# Patient Record
Sex: FEMALE | Race: WHITE | NOT HISPANIC OR LATINO
[De-identification: names, ages, dates, MRNs, and addresses within clinical notes are randomized per-mention and may not be internally consistent; named-entity substitution may affect disease eponyms.]

---

## 2017-02-16 ENCOUNTER — RESULT REVIEW (OUTPATIENT)
Age: 72
End: 2017-02-16

## 2018-03-16 ENCOUNTER — RESULT REVIEW (OUTPATIENT)
Age: 73
End: 2018-03-16

## 2019-12-02 ENCOUNTER — APPOINTMENT (OUTPATIENT)
Dept: ORTHOPEDIC SURGERY | Facility: CLINIC | Age: 74
End: 2019-12-02
Payer: MEDICARE

## 2019-12-02 VITALS
WEIGHT: 145 LBS | HEIGHT: 59 IN | DIASTOLIC BLOOD PRESSURE: 80 MMHG | BODY MASS INDEX: 29.23 KG/M2 | SYSTOLIC BLOOD PRESSURE: 169 MMHG

## 2019-12-02 DIAGNOSIS — M19.011 PRIMARY OSTEOARTHRITIS, RIGHT SHOULDER: ICD-10-CM

## 2019-12-02 DIAGNOSIS — M67.911 UNSPECIFIED DISORDER OF SYNOVIUM AND TENDON, RIGHT SHOULDER: ICD-10-CM

## 2019-12-02 PROCEDURE — 73030 X-RAY EXAM OF SHOULDER: CPT | Mod: RT

## 2019-12-02 PROCEDURE — 99203 OFFICE O/P NEW LOW 30 MIN: CPT

## 2021-02-04 ENCOUNTER — TRANSCRIPTION ENCOUNTER (OUTPATIENT)
Age: 76
End: 2021-02-04

## 2022-06-20 ENCOUNTER — APPOINTMENT (OUTPATIENT)
Dept: PULMONOLOGY | Facility: CLINIC | Age: 77
End: 2022-06-20
Payer: MEDICARE

## 2022-06-20 ENCOUNTER — NON-APPOINTMENT (OUTPATIENT)
Age: 77
End: 2022-06-20

## 2022-06-20 VITALS
BODY MASS INDEX: 29.03 KG/M2 | HEIGHT: 59 IN | OXYGEN SATURATION: 98 % | SYSTOLIC BLOOD PRESSURE: 147 MMHG | DIASTOLIC BLOOD PRESSURE: 73 MMHG | WEIGHT: 144 LBS | HEART RATE: 72 BPM

## 2022-06-20 PROCEDURE — ZZZZZ: CPT

## 2022-06-20 PROCEDURE — 94729 DIFFUSING CAPACITY: CPT

## 2022-06-20 PROCEDURE — 99204 OFFICE O/P NEW MOD 45 MIN: CPT | Mod: 25

## 2022-06-20 PROCEDURE — 94727 GAS DIL/WSHOT DETER LNG VOL: CPT

## 2022-06-20 PROCEDURE — 94010 BREATHING CAPACITY TEST: CPT

## 2022-06-20 PROCEDURE — 94618 PULMONARY STRESS TESTING: CPT

## 2022-06-20 NOTE — HISTORY OF PRESENT ILLNESS
[Never] : never [TextBox_4] : 77F HLD, GERD\par \par reports MOODY for last few years\par not at rest, worst in bed when moving around\par doesn't walk much but gets winded when she does\par uphill is difficult\par interestingly, she folk dances and does not find herself sob when doing so\par denies coughing/wheezing/chest pain on exertion\par never hospital for respiratory issues\par has been told her heart function is "ok", some minor issues but nothing that needs to be addressed.

## 2022-06-20 NOTE — REVIEW OF SYSTEMS
[Cough] : no cough [Sputum] : no sputum [SOB on Exertion] : sob on exertion [Negative] : Constitutional

## 2022-06-20 NOTE — ASSESSMENT
[FreeTextEntry1] : suggest ct chest to further evaluate\par perhaps scoliosis is limiting her capacity when exerting herself although she doesn’t have an issue when dancing which is strange.\par may need supplemental O2 but will check ct chest first and repeat exercise oximetry at follow up to confirm.

## 2022-06-20 NOTE — PROCEDURE
[FreeTextEntry1] : cxr 4/21/22 at [facility] shows clear lungs and scoliosis.\par \par PFT: Normal spirometry.  Lung volumes normal. DLCO normal.\par \par exercise oximetry: desat to 87% after 3 min of walking on room air, improves with 3L\par

## 2022-06-20 NOTE — CONSULT LETTER
[FreeTextEntry1] : Dear ,\par \par I had the pleasure of evaluating your patient, SERGO BURROWS today in pulmonary consultation.  Please refer to my attached note for my findings and recommendations.\par \par \par Thank you for allowing me to participate in the care of your patient, please feel free to call with any questions or concerns.\par \par \par Sincerely,\par \par Hellen Lai MD\par [facility] \par [facility]\par \par

## 2022-07-01 ENCOUNTER — OUTPATIENT (OUTPATIENT)
Dept: OUTPATIENT SERVICES | Facility: HOSPITAL | Age: 77
LOS: 1 days | End: 2022-07-01
Payer: MEDICARE

## 2022-07-01 ENCOUNTER — APPOINTMENT (OUTPATIENT)
Dept: CT IMAGING | Facility: CLINIC | Age: 77
End: 2022-07-01

## 2022-07-01 DIAGNOSIS — R06.00 DYSPNEA, UNSPECIFIED: ICD-10-CM

## 2022-07-01 PROCEDURE — 71250 CT THORAX DX C-: CPT | Mod: 26,MH

## 2022-07-01 PROCEDURE — 71250 CT THORAX DX C-: CPT

## 2022-07-15 ENCOUNTER — APPOINTMENT (OUTPATIENT)
Dept: PULMONOLOGY | Facility: CLINIC | Age: 77
End: 2022-07-15

## 2022-07-15 VITALS — DIASTOLIC BLOOD PRESSURE: 70 MMHG | SYSTOLIC BLOOD PRESSURE: 138 MMHG

## 2022-07-15 VITALS
HEART RATE: 85 BPM | WEIGHT: 142 LBS | OXYGEN SATURATION: 97 % | SYSTOLIC BLOOD PRESSURE: 170 MMHG | DIASTOLIC BLOOD PRESSURE: 78 MMHG | BODY MASS INDEX: 28.68 KG/M2

## 2022-07-15 PROCEDURE — 94618 PULMONARY STRESS TESTING: CPT

## 2022-07-15 PROCEDURE — 99214 OFFICE O/P EST MOD 30 MIN: CPT | Mod: 25

## 2022-07-15 NOTE — HISTORY OF PRESENT ILLNESS
[Never] : never [TextBox_4] : here to repeat exercise oximetry and review chest ct\par \par Prior hx:\par \par 77F HLD, GERD\par \par reports MOODY for last few years\par not at rest, worst in bed when moving around\par doesn't walk much but gets winded when she does\par uphill is difficult\par interestingly, she folk dances and does not find herself sob when doing so\par denies coughing/wheezing/chest pain on exertion\par never hospital for respiratory issues\par has been told her heart function is "ok", some minor issues but nothing that needs to be addressed.

## 2022-07-15 NOTE — PROCEDURE
[FreeTextEntry1] : exercise oximetry: no O2 desat with 6 min of walking on room air\par \par EXAM: [id number] - CT CHEST - ORDERED BY: GERARDO BONILLA\par \par \par PROCEDURE DATE: 07/01/2022\par \par \par \par INTERPRETATION: HISTORY: Ordering Dxs: R06.00 Dyspnea, unspecified /// Admitting Dxs: R06.00 DYSPNEA, UNSPECIFIED. Dyspnea exertion with hypoxia.\par \par EXAMINATION: CT CHEST was performed without IV contrast.\par \par COMPARISON: 4/20/2016 cardiac CT.\par \par FINDINGS:\par \par AIRWAYS, LUNGS, PLEURA: Clear central airways. Mild scarring at the lung apices. Left basilar 0.7 x 0.6 cm solid nodule (image 108, series 2). No focal lung consolidation. No pleural effusion.\par \par MEDIASTINUM: Heart size normal. Coronary atherosclerosis. No pericardial effusion. Thoracic aorta normal caliber. No large mediastinal lymph nodes.\par \par IMAGED ABDOMEN: Unremarkable.\par \par SOFT TISSUES: Unremarkable.\par \par BONES: No acute osseous abnormality.\par \par \par IMPRESSION:.\par \par Left lower lobe 0.7 cm nodule; indeterminate and recommend CT chest follow-up in 6 months to assess stability.\par \par No focal lung consolidation.\par \par Coronary atherosclerosis.\par \par --- End of Report ---\par \par  JOVI SHIPMAN MD; Attending Radiologist\par This document has been electronically signed. Jul 8 2022 6:20AM\par \par Reviewed:\par \par cxr 4/21/22 at [facility] shows clear lungs and scoliosis.\par \par PFT: Normal spirometry.  Lung volumes normal. DLCO normal.\par \par exercise oximetry: desat to 87% after 3 min of walking on room air, improves with 3L\par

## 2022-07-15 NOTE — CONSULT LETTER
[FreeTextEntry1] : Dear ,\par \par I had the pleasure of evaluating your patient, SERGO BURROWS today in pulmonary consultation.  Please refer to my attached note for my findings and recommendations.\par \par \par Thank you for allowing me to participate in the care of your patient, please feel free to call with any questions or concerns.\par \par \par Sincerely,\par \par Hellen Lai MD\par [facility] \par [facility]\par \par

## 2022-07-15 NOTE — ASSESSMENT
[FreeTextEntry1] : \par perhaps scoliosis is limiting her capacity when exerting herself although she doesn’t have an issue when dancing which is strange.\par \par cont to exercise\par \par \par Nodule needs fu 6 mo

## 2022-07-15 NOTE — REVIEW OF SYSTEMS
[SOB on Exertion] : sob on exertion [Negative] : Constitutional [Cough] : no cough [Sputum] : no sputum

## 2022-11-09 ENCOUNTER — OFFICE (OUTPATIENT)
Dept: URBAN - METROPOLITAN AREA CLINIC 109 | Facility: CLINIC | Age: 77
Setting detail: OPHTHALMOLOGY
End: 2022-11-09
Payer: MEDICARE

## 2022-11-09 DIAGNOSIS — H35.031: ICD-10-CM

## 2022-11-09 DIAGNOSIS — H35.371: ICD-10-CM

## 2022-11-09 PROCEDURE — 92134 CPTRZ OPH DX IMG PST SGM RTA: CPT | Performed by: OPHTHALMOLOGY

## 2022-11-09 PROCEDURE — 92014 COMPRE OPH EXAM EST PT 1/>: CPT | Performed by: OPHTHALMOLOGY

## 2022-11-09 ASSESSMENT — REFRACTION_MANIFEST
OD_ADD: +2.75
OD_VA1: 20/25
OS_SPHERE: +1.00
OS_VA1: 20/25
OS_ADD: +2.75
OS_CYLINDER: -2.50
OD_AXIS: 85
OS_AXIS: 75
OD_SPHERE: +1.50
OD_CYLINDER: -1.50

## 2022-11-09 ASSESSMENT — TONOMETRY
OS_IOP_MMHG: 17
OD_IOP_MMHG: 17

## 2022-11-09 ASSESSMENT — SPHEQUIV_DERIVED
OD_SPHEQUIV: 0.75
OD_SPHEQUIV: 0.625
OS_SPHEQUIV: 0
OS_SPHEQUIV: -0.25

## 2022-11-09 ASSESSMENT — REFRACTION_CURRENTRX
OD_CYLINDER: -1.50
OS_OVR_VA: 20/
OS_SPHERE: +2.75
OS_AXIS: 84
OS_CYLINDER: -2.50
OD_AXIS: 78
OD_SPHERE: +1.50
OD_OVR_VA: 20/

## 2022-11-09 ASSESSMENT — REFRACTION_AUTOREFRACTION
OS_AXIS: 76
OD_CYLINDER: -1.75
OD_AXIS: 90
OS_CYLINDER: -2.50
OS_SPHERE: +1.25
OD_SPHERE: +1.50

## 2022-11-09 ASSESSMENT — CONFRONTATIONAL VISUAL FIELD TEST (CVF)
OS_FINDINGS: FULL
OD_FINDINGS: FULL

## 2022-11-09 ASSESSMENT — VISUAL ACUITY
OS_BCVA: 20/40-2
OD_BCVA: 20/40

## 2023-02-06 ENCOUNTER — APPOINTMENT (OUTPATIENT)
Dept: PULMONOLOGY | Facility: CLINIC | Age: 78
End: 2023-02-06
Payer: MEDICARE

## 2023-02-06 VITALS
SYSTOLIC BLOOD PRESSURE: 125 MMHG | OXYGEN SATURATION: 97 % | WEIGHT: 151 LBS | DIASTOLIC BLOOD PRESSURE: 57 MMHG | BODY MASS INDEX: 30.44 KG/M2 | HEIGHT: 59 IN | HEART RATE: 74 BPM

## 2023-02-06 PROCEDURE — 99213 OFFICE O/P EST LOW 20 MIN: CPT

## 2023-02-06 NOTE — PROCEDURE
[FreeTextEntry1] : Prior exams reviewed:\par \par \par exercise oximetry: no O2 desat with 6 min of walking on room air\par \par EXAM: [id number] - CT CHEST - ORDERED BY: GERARDO BONILLA\par \par \par PROCEDURE DATE: 07/01/2022\par \par \par \par INTERPRETATION: HISTORY: Ordering Dxs: R06.00 Dyspnea, unspecified /// Admitting Dxs: R06.00 DYSPNEA, UNSPECIFIED. Dyspnea exertion with hypoxia.\par \par EXAMINATION: CT CHEST was performed without IV contrast.\par \par COMPARISON: 4/20/2016 cardiac CT.\par \par FINDINGS:\par \par AIRWAYS, LUNGS, PLEURA: Clear central airways. Mild scarring at the lung apices. Left basilar 0.7 x 0.6 cm solid nodule (image 108, series 2). No focal lung consolidation. No pleural effusion.\par \par MEDIASTINUM: Heart size normal. Coronary atherosclerosis. No pericardial effusion. Thoracic aorta normal caliber. No large mediastinal lymph nodes.\par \par IMAGED ABDOMEN: Unremarkable.\par \par SOFT TISSUES: Unremarkable.\par \par BONES: No acute osseous abnormality.\par \par \par IMPRESSION:.\par \par Left lower lobe 0.7 cm nodule; indeterminate and recommend CT chest follow-up in 6 months to assess stability.\par \par No focal lung consolidation.\par \par Coronary atherosclerosis.\par \par --- End of Report ---\par \par  JOVI SHIPMAN MD; Attending Radiologist\par This document has been electronically signed. Jul 8 2022 6:20AM\par \par Reviewed:\par \par cxr 4/21/22 at [facility] shows clear lungs and scoliosis.\par \par PFT: Normal spirometry.  Lung volumes normal. DLCO normal.\par \par exercise oximetry: desat to 87% after 3 min of walking on room air, improves with 3L\par

## 2023-02-06 NOTE — HISTORY OF PRESENT ILLNESS
[TextBox_4] : due for repeat ct chest to fu 7mm nodule\par \par stable chronic [place], still dances without issue

## 2023-02-22 ENCOUNTER — APPOINTMENT (OUTPATIENT)
Dept: CT IMAGING | Facility: CLINIC | Age: 78
End: 2023-02-22
Payer: MEDICARE

## 2023-02-22 ENCOUNTER — OUTPATIENT (OUTPATIENT)
Dept: OUTPATIENT SERVICES | Facility: HOSPITAL | Age: 78
LOS: 1 days | End: 2023-02-22
Payer: MEDICARE

## 2023-02-22 DIAGNOSIS — R91.1 SOLITARY PULMONARY NODULE: ICD-10-CM

## 2023-02-22 PROCEDURE — 71250 CT THORAX DX C-: CPT | Mod: 26,MH

## 2023-02-22 PROCEDURE — 71250 CT THORAX DX C-: CPT

## 2023-07-27 ENCOUNTER — NON-APPOINTMENT (OUTPATIENT)
Age: 78
End: 2023-07-27

## 2023-08-01 ENCOUNTER — APPOINTMENT (OUTPATIENT)
Dept: ORTHOPEDIC SURGERY | Facility: CLINIC | Age: 78
End: 2023-08-01
Payer: MEDICARE

## 2023-08-01 ENCOUNTER — NON-APPOINTMENT (OUTPATIENT)
Age: 78
End: 2023-08-01

## 2023-08-01 VITALS — BODY MASS INDEX: 28.22 KG/M2 | HEIGHT: 59 IN | WEIGHT: 140 LBS

## 2023-08-01 PROCEDURE — 99203 OFFICE O/P NEW LOW 30 MIN: CPT | Mod: 25

## 2023-08-01 PROCEDURE — 29075 APPL CST ELBW FNGR SHORT ARM: CPT | Mod: LT

## 2023-08-01 PROCEDURE — 73110 X-RAY EXAM OF WRIST: CPT | Mod: LT

## 2023-08-07 ENCOUNTER — OUTPATIENT (OUTPATIENT)
Dept: OUTPATIENT SERVICES | Facility: HOSPITAL | Age: 78
LOS: 1 days | End: 2023-08-07
Payer: MEDICARE

## 2023-08-07 ENCOUNTER — APPOINTMENT (OUTPATIENT)
Dept: CT IMAGING | Facility: CLINIC | Age: 78
End: 2023-08-07
Payer: MEDICARE

## 2023-08-07 DIAGNOSIS — R91.1 SOLITARY PULMONARY NODULE: ICD-10-CM

## 2023-08-07 PROCEDURE — 71250 CT THORAX DX C-: CPT | Mod: 26,MH

## 2023-08-07 PROCEDURE — 71250 CT THORAX DX C-: CPT

## 2023-08-08 ENCOUNTER — APPOINTMENT (OUTPATIENT)
Dept: ORTHOPEDIC SURGERY | Facility: CLINIC | Age: 78
End: 2023-08-08
Payer: MEDICARE

## 2023-08-08 PROCEDURE — 99213 OFFICE O/P EST LOW 20 MIN: CPT | Mod: 24

## 2023-08-08 PROCEDURE — 73110 X-RAY EXAM OF WRIST: CPT | Mod: LT

## 2023-08-15 ENCOUNTER — APPOINTMENT (OUTPATIENT)
Dept: ORTHOPEDIC SURGERY | Facility: CLINIC | Age: 78
End: 2023-08-15

## 2023-08-17 ENCOUNTER — APPOINTMENT (OUTPATIENT)
Dept: PULMONOLOGY | Facility: CLINIC | Age: 78
End: 2023-08-17
Payer: MEDICARE

## 2023-08-17 VITALS
BODY MASS INDEX: 29.23 KG/M2 | HEIGHT: 59 IN | OXYGEN SATURATION: 98 % | SYSTOLIC BLOOD PRESSURE: 139 MMHG | WEIGHT: 145 LBS | HEART RATE: 77 BPM | DIASTOLIC BLOOD PRESSURE: 74 MMHG

## 2023-08-17 DIAGNOSIS — R91.1 SOLITARY PULMONARY NODULE: ICD-10-CM

## 2023-08-17 DIAGNOSIS — R06.00 DYSPNEA, UNSPECIFIED: ICD-10-CM

## 2023-08-17 PROCEDURE — 99214 OFFICE O/P EST MOD 30 MIN: CPT

## 2023-08-17 NOTE — ASSESSMENT
[FreeTextEntry1] : nodules stable, repeat ct 1 year  dyspnea stable, normal pulm testing, exercise encouraged  fu 1 year

## 2023-08-17 NOTE — PROCEDURE
[FreeTextEntry1] :   EXAM: [id number] - CT CHEST - ORDERED BY: GERARDO BONILLA   PROCEDURE DATE: 08/07/2023    INTERPRETATION: INDICATION: Nodule follow-up  TECHNIQUE: A volumetric CT acquisition of the chest was obtained from the thoracic inlet to the upper abdomen without the use of intravenous contrast. Coronal and sagittal reconstructed images are provided.  COMPARISON: Chest CT 2/22/2023  FINDINGS:  Lungs/Airways/Pleura: The central airways are patent. No pleural effusion. A 6 mm left lower lobe nodule on series 3, image 121 and 5 mm nodule along the right major fissure in the superior segment right lower lobe on image 61 are unchanged since July 2022. The lungs are otherwise clear. No new nodules.  Mediastinum/Lymph nodes: No thoracic adenopathy.  Heart and Vessels: The great vessels are normal in size. The heart is normal in size. No pericardial effusion. Coronary artery calcification.  Upper Abdomen: Unremarkable.  Osseous structures and Soft Tissues: Reverse left shoulder arthroplasty. Old left posterolateral third rib fracture.  IMPRESSION: Stable 6 mm left lower lobe and 5 mm right perifissural nodules since July 2022. No new nodules.  --- End of Report ---       KATIE MOHAMUD M.D., Attending Radiologist This document has been electronically signed. Aug 9 2023 4:57PM  Prior exams reviewed:   exercise oximetry: no O2 desat with 6 min of walking on room air  EXAM: [id number] - CT CHEST - ORDERED BY: GERARDO BONILLA   PROCEDURE DATE: 07/01/2022    INTERPRETATION: HISTORY: Ordering Dxs: R06.00 Dyspnea, unspecified /// Admitting Dxs: R06.00 DYSPNEA, UNSPECIFIED. Dyspnea exertion with hypoxia.  EXAMINATION: CT CHEST was performed without IV contrast.  COMPARISON: 4/20/2016 cardiac CT.  FINDINGS:  AIRWAYS, LUNGS, PLEURA: Clear central airways. Mild scarring at the lung apices. Left basilar 0.7 x 0.6 cm solid nodule (image 108, series 2). No focal lung consolidation. No pleural effusion.  MEDIASTINUM: Heart size normal. Coronary atherosclerosis. No pericardial effusion. Thoracic aorta normal caliber. No large mediastinal lymph nodes.  IMAGED ABDOMEN: Unremarkable.  SOFT TISSUES: Unremarkable.  BONES: No acute osseous abnormality.   IMPRESSION:.  Left lower lobe 0.7 cm nodule; indeterminate and recommend CT chest follow-up in 6 months to assess stability.  No focal lung consolidation.  Coronary atherosclerosis.  --- End of Report ---   JOVI SHIPMAN MD; Attending Radiologist This document has been electronically signed. Jul 8 2022 6:20AM  Reviewed:  cxr 4/21/22 at [facility] shows clear lungs and scoliosis.  PFT: Normal spirometry.  Lung volumes normal. DLCO normal.  exercise oximetry: desat to 87% after 3 min of walking on room air, improves with 3L

## 2023-08-17 NOTE — HISTORY OF PRESENT ILLNESS
[Never] : never [TextBox_4] : had repeat ct stable nodules stable dyspnea, doesn't exercise much fell and broke her wrist

## 2023-08-22 ENCOUNTER — APPOINTMENT (OUTPATIENT)
Dept: ORTHOPEDIC SURGERY | Facility: CLINIC | Age: 78
End: 2023-08-22
Payer: MEDICARE

## 2023-08-22 PROCEDURE — 29075 APPL CST ELBW FNGR SHORT ARM: CPT | Mod: LT

## 2023-08-22 PROCEDURE — 99213 OFFICE O/P EST LOW 20 MIN: CPT | Mod: 25

## 2023-08-22 PROCEDURE — 73110 X-RAY EXAM OF WRIST: CPT | Mod: LT

## 2023-09-12 ENCOUNTER — APPOINTMENT (OUTPATIENT)
Dept: ORTHOPEDIC SURGERY | Facility: CLINIC | Age: 78
End: 2023-09-12
Payer: MEDICARE

## 2023-09-12 PROCEDURE — 99213 OFFICE O/P EST LOW 20 MIN: CPT

## 2023-09-12 PROCEDURE — 73110 X-RAY EXAM OF WRIST: CPT | Mod: LT

## 2023-09-26 ENCOUNTER — APPOINTMENT (OUTPATIENT)
Dept: ORTHOPEDIC SURGERY | Facility: CLINIC | Age: 78
End: 2023-09-26

## 2023-10-03 ENCOUNTER — APPOINTMENT (OUTPATIENT)
Dept: ORTHOPEDIC SURGERY | Facility: CLINIC | Age: 78
End: 2023-10-03
Payer: MEDICARE

## 2023-10-03 PROCEDURE — 99213 OFFICE O/P EST LOW 20 MIN: CPT | Mod: 25

## 2023-10-03 PROCEDURE — 73110 X-RAY EXAM OF WRIST: CPT | Mod: LT

## 2023-11-09 ENCOUNTER — OFFICE (OUTPATIENT)
Dept: URBAN - METROPOLITAN AREA CLINIC 109 | Facility: CLINIC | Age: 78
Setting detail: OPHTHALMOLOGY
End: 2023-11-09
Payer: MEDICARE

## 2023-11-09 DIAGNOSIS — H35.031: ICD-10-CM

## 2023-11-09 DIAGNOSIS — H35.371: ICD-10-CM

## 2023-11-09 PROCEDURE — 92014 COMPRE OPH EXAM EST PT 1/>: CPT | Performed by: OPHTHALMOLOGY

## 2023-11-09 PROCEDURE — 92201 OPSCPY EXTND RTA DRAW UNI/BI: CPT | Performed by: OPHTHALMOLOGY

## 2023-11-09 PROCEDURE — 92134 CPTRZ OPH DX IMG PST SGM RTA: CPT | Performed by: OPHTHALMOLOGY

## 2023-11-09 ASSESSMENT — REFRACTION_MANIFEST
OD_VA1: 20/25
OD_SPHERE: +1.50
OD_CYLINDER: -1.50
OS_CYLINDER: -2.50
OS_SPHERE: +1.00
OS_AXIS: 75
OS_VA1: 20/25
OD_AXIS: 85
OD_ADD: +2.75
OS_ADD: +2.75

## 2023-11-09 ASSESSMENT — REFRACTION_AUTOREFRACTION
OD_SPHERE: +2.00
OD_AXIS: 79
OS_CYLINDER: -4.00
OD_CYLINDER: -1.50
OS_AXIS: 83
OS_SPHERE: +2.00

## 2023-11-09 ASSESSMENT — CONFRONTATIONAL VISUAL FIELD TEST (CVF)
OS_FINDINGS: FULL
OD_FINDINGS: FULL

## 2023-11-09 ASSESSMENT — SPHEQUIV_DERIVED
OS_SPHEQUIV: 0
OD_SPHEQUIV: 0.75
OS_SPHEQUIV: -0.25
OD_SPHEQUIV: 1.25

## 2023-11-09 ASSESSMENT — REFRACTION_CURRENTRX
OD_SPHERE: +1.50
OD_CYLINDER: -1.50
OS_OVR_VA: 20/
OD_OVR_VA: 20/
OS_AXIS: 84
OD_AXIS: 78
OS_CYLINDER: -2.50
OS_SPHERE: +2.75

## 2023-11-14 ENCOUNTER — APPOINTMENT (OUTPATIENT)
Dept: ORTHOPEDIC SURGERY | Facility: CLINIC | Age: 78
End: 2023-11-14
Payer: MEDICARE

## 2023-11-14 VITALS — WEIGHT: 140 LBS | BODY MASS INDEX: 28.22 KG/M2 | HEIGHT: 59 IN

## 2023-11-14 PROCEDURE — 99213 OFFICE O/P EST LOW 20 MIN: CPT | Mod: 25

## 2024-02-16 ENCOUNTER — APPOINTMENT (OUTPATIENT)
Dept: ORTHOPEDIC SURGERY | Facility: CLINIC | Age: 79
End: 2024-02-16
Payer: MEDICARE

## 2024-02-16 VITALS — WEIGHT: 140 LBS | BODY MASS INDEX: 28.22 KG/M2 | HEIGHT: 59 IN

## 2024-02-16 DIAGNOSIS — S52.502D UNSPECIFIED FRACTURE OF THE LOWER END OF LEFT RADIUS, SUBSEQUENT ENCOUNTER FOR CLOSED FRACTURE WITH ROUTINE HEALING: ICD-10-CM

## 2024-02-16 PROCEDURE — 99213 OFFICE O/P EST LOW 20 MIN: CPT | Mod: 25

## 2024-08-01 DIAGNOSIS — R91.1 SOLITARY PULMONARY NODULE: ICD-10-CM

## 2024-08-07 ENCOUNTER — APPOINTMENT (OUTPATIENT)
Dept: CT IMAGING | Facility: CLINIC | Age: 79
End: 2024-08-07

## 2024-08-07 ENCOUNTER — OUTPATIENT (OUTPATIENT)
Dept: OUTPATIENT SERVICES | Facility: HOSPITAL | Age: 79
LOS: 1 days | End: 2024-08-07
Payer: MEDICARE

## 2024-08-07 DIAGNOSIS — R91.1 SOLITARY PULMONARY NODULE: ICD-10-CM

## 2024-08-07 PROCEDURE — 71250 CT THORAX DX C-: CPT | Mod: 26,MH

## 2024-08-07 PROCEDURE — 71250 CT THORAX DX C-: CPT

## 2024-08-19 ENCOUNTER — APPOINTMENT (OUTPATIENT)
Dept: PULMONOLOGY | Facility: CLINIC | Age: 79
End: 2024-08-19
Payer: MEDICARE

## 2024-08-19 VITALS
BODY MASS INDEX: 29.84 KG/M2 | WEIGHT: 148 LBS | HEIGHT: 59 IN | HEART RATE: 70 BPM | OXYGEN SATURATION: 96 % | DIASTOLIC BLOOD PRESSURE: 78 MMHG | SYSTOLIC BLOOD PRESSURE: 154 MMHG

## 2024-08-19 VITALS — SYSTOLIC BLOOD PRESSURE: 130 MMHG | DIASTOLIC BLOOD PRESSURE: 70 MMHG

## 2024-08-19 DIAGNOSIS — R91.1 SOLITARY PULMONARY NODULE: ICD-10-CM

## 2024-08-19 PROCEDURE — 99213 OFFICE O/P EST LOW 20 MIN: CPT | Mod: 25

## 2024-08-19 PROCEDURE — 94010 BREATHING CAPACITY TEST: CPT

## 2024-08-19 NOTE — PROCEDURE
[FreeTextEntry1] : byron: normal  Reviewed:    	 EXAM: [id number] - CT CHEST  - ORDERED BY: GERARDO CHARLES CHAD   PROCEDURE DATE:  08/07/2024    INTERPRETATION:  Clinical information: Pulmonary nodule. Exam is compared to previous study of 8/7/2023.  CT scan of the chest was obtained with administration of intravenous contrast.  Few small lymph nodes are present in the mediastinum.  Heart is normal in size. Calcification of the coronary arteries is noted. No pericardial effusion is noted.  No endobronchial lesions are noted. Once again, two nodules measuring less than 0.6 cm are noted in the left lower lobe. They are unchanged when compared to previous exam. Small nodule noted involving the upper portion of the right major fissure is unchanged when compared to previous exam. No pleural effusions are noted.  Below the diaphragm, visualized portions of the abdomen are unremarkable.  Degenerative changes of the spine are noted.  IMPRESSION: Stable two small nodules in the left lower lobe when compared to previous exam.  --- End of Report ---       DASHAWN SYKES MD; Attending Radiologist This document has been electronically signed. Aug 13 2024  2:34P  EXAM: [id number] - CT CHEST - ORDERED BY: GERARDO BONILLA   PROCEDURE DATE: 08/07/2023    INTERPRETATION: INDICATION: Nodule follow-up  TECHNIQUE: A volumetric CT acquisition of the chest was obtained from the thoracic inlet to the upper abdomen without the use of intravenous contrast. Coronal and sagittal reconstructed images are provided.  COMPARISON: Chest CT 2/22/2023  FINDINGS:  Lungs/Airways/Pleura: The central airways are patent. No pleural effusion. A 6 mm left lower lobe nodule on series 3, image 121 and 5 mm nodule along the right major fissure in the superior segment right lower lobe on image 61 are unchanged since July 2022. The lungs are otherwise clear. No new nodules.  Mediastinum/Lymph nodes: No thoracic adenopathy.  Heart and Vessels: The great vessels are normal in size. The heart is normal in size. No pericardial effusion. Coronary artery calcification.  Upper Abdomen: Unremarkable.  Osseous structures and Soft Tissues: Reverse left shoulder arthroplasty. Old left posterolateral third rib fracture.  IMPRESSION: Stable 6 mm left lower lobe and 5 mm right perifissural nodules since July 2022. No new nodules.  --- End of Report ---       MELINE HOVNANIAN M.D., Attending Radiologist This document has been electronically signed. Aug 9 2023 4:57PM  Prior exams reviewed:   exercise oximetry: no O2 desat with 6 min of walking on room air  EXAM: [id number] - CT CHEST - ORDERED BY: GERARDO BNOILLA   PROCEDURE DATE: 07/01/2022    INTERPRETATION: HISTORY: Ordering Dxs: R06.00 Dyspnea, unspecified /// Admitting Dxs: R06.00 DYSPNEA, UNSPECIFIED. Dyspnea exertion with hypoxia.  EXAMINATION: CT CHEST was performed without IV contrast.  COMPARISON: 4/20/2016 cardiac CT.  FINDINGS:  AIRWAYS, LUNGS, PLEURA: Clear central airways. Mild scarring at the lung apices. Left basilar 0.7 x 0.6 cm solid nodule (image 108, series 2). No focal lung consolidation. No pleural effusion.  MEDIASTINUM: Heart size normal. Coronary atherosclerosis. No pericardial effusion. Thoracic aorta normal caliber. No large mediastinal lymph nodes.  IMAGED ABDOMEN: Unremarkable.  SOFT TISSUES: Unremarkable.  BONES: No acute osseous abnormality.   IMPRESSION:.  Left lower lobe 0.7 cm nodule; indeterminate and recommend CT chest follow-up in 6 months to assess stability.  No focal lung consolidation.  Coronary atherosclerosis.  --- End of Report ---   JOVI SHIPMAN MD; Attending Radiologist This document has been electronically signed. Jul 8 2022 6:20AM  Reviewed:  cxr 4/21/22 at [facility] shows clear lungs and scoliosis.  PFT: Normal spirometry.  Lung volumes normal. DLCO normal.  exercise oximetry: desat to 87% after 3 min of walking on room air, improves with 3L

## 2024-11-15 ENCOUNTER — OFFICE (OUTPATIENT)
Dept: URBAN - METROPOLITAN AREA CLINIC 109 | Facility: CLINIC | Age: 79
Setting detail: OPHTHALMOLOGY
End: 2024-11-15
Payer: MEDICARE

## 2024-11-15 DIAGNOSIS — H35.371: ICD-10-CM

## 2024-11-15 PROCEDURE — 92201 OPSCPY EXTND RTA DRAW UNI/BI: CPT | Performed by: OPHTHALMOLOGY

## 2024-11-15 PROCEDURE — 92134 CPTRZ OPH DX IMG PST SGM RTA: CPT | Performed by: OPHTHALMOLOGY

## 2024-11-15 PROCEDURE — 92014 COMPRE OPH EXAM EST PT 1/>: CPT | Performed by: OPHTHALMOLOGY

## 2024-11-15 ASSESSMENT — REFRACTION_CURRENTRX
OD_AXIS: 78
OS_SPHERE: +2.75
OS_OVR_VA: 20/
OS_AXIS: 84
OD_OVR_VA: 20/
OD_CYLINDER: -1.50
OD_SPHERE: +1.50
OS_CYLINDER: -2.50

## 2024-11-15 ASSESSMENT — REFRACTION_MANIFEST
OS_ADD: +2.75
OD_CYLINDER: -1.50
OS_CYLINDER: -2.50
OD_VA1: 20/25
OD_ADD: +2.75
OD_AXIS: 85
OD_SPHERE: +1.50
OS_AXIS: 75
OS_VA1: 20/25
OS_SPHERE: +1.00

## 2024-11-15 ASSESSMENT — REFRACTION_AUTOREFRACTION
OS_CYLINDER: -3.50
OS_SPHERE: +1.50
OS_AXIS: 087
OD_CYLINDER: -2.00
OD_AXIS: 089
OD_SPHERE: +2.25

## 2024-11-15 ASSESSMENT — KERATOMETRY
OS_K2POWER_DIOPTERS: 45.25
OS_AXISANGLE_DEGREES: 176
OS_K1POWER_DIOPTERS: 42.25
OD_AXISANGLE_DEGREES: 165
OD_K2POWER_DIOPTERS: 43.75
OD_K1POWER_DIOPTERS: 43.00

## 2024-11-15 ASSESSMENT — CONFRONTATIONAL VISUAL FIELD TEST (CVF)
OD_FINDINGS: FULL
OS_FINDINGS: FULL

## 2024-11-15 ASSESSMENT — TONOMETRY
OS_IOP_MMHG: 12
OD_IOP_MMHG: 11

## 2024-11-15 ASSESSMENT — VISUAL ACUITY
OD_BCVA: 20/30+2
OS_BCVA: 20/25+2

## 2025-07-24 DIAGNOSIS — R91.1 SOLITARY PULMONARY NODULE: ICD-10-CM

## 2025-08-18 ENCOUNTER — APPOINTMENT (OUTPATIENT)
Dept: CT IMAGING | Facility: CLINIC | Age: 80
End: 2025-08-18
Payer: MEDICARE

## 2025-08-18 ENCOUNTER — OUTPATIENT (OUTPATIENT)
Dept: OUTPATIENT SERVICES | Facility: HOSPITAL | Age: 80
LOS: 1 days | End: 2025-08-18
Payer: MEDICARE

## 2025-08-18 DIAGNOSIS — R91.1 SOLITARY PULMONARY NODULE: ICD-10-CM

## 2025-08-18 PROCEDURE — 71250 CT THORAX DX C-: CPT

## 2025-08-18 PROCEDURE — 71250 CT THORAX DX C-: CPT | Mod: 26

## 2025-09-04 ENCOUNTER — APPOINTMENT (OUTPATIENT)
Dept: PULMONOLOGY | Facility: CLINIC | Age: 80
End: 2025-09-04
Payer: MEDICARE

## 2025-09-04 VITALS
BODY MASS INDEX: 32.95 KG/M2 | SYSTOLIC BLOOD PRESSURE: 159 MMHG | OXYGEN SATURATION: 94 % | HEIGHT: 58 IN | DIASTOLIC BLOOD PRESSURE: 88 MMHG | WEIGHT: 157 LBS | HEART RATE: 88 BPM

## 2025-09-04 DIAGNOSIS — R91.1 SOLITARY PULMONARY NODULE: ICD-10-CM

## 2025-09-04 PROCEDURE — G2211 COMPLEX E/M VISIT ADD ON: CPT

## 2025-09-04 PROCEDURE — 99213 OFFICE O/P EST LOW 20 MIN: CPT

## 2025-09-04 RX ORDER — ATORVASTATIN CALCIUM 20 MG/1
20 TABLET, FILM COATED ORAL
Refills: 0 | Status: ACTIVE | COMMUNITY

## 2025-09-04 RX ORDER — VIBEGRON 75 MG/1
TABLET, FILM COATED ORAL
Refills: 0 | Status: ACTIVE | COMMUNITY

## 2025-09-04 RX ORDER — OMEPRAZOLE 20 MG/1
20 CAPSULE, DELAYED RELEASE ORAL
Refills: 0 | Status: ACTIVE | COMMUNITY

## 2025-09-04 RX ORDER — NAPROXEN SODIUM 220 MG
220 TABLET ORAL
Refills: 0 | Status: ACTIVE | COMMUNITY

## 2025-09-04 RX ORDER — ALENDRONATE SODIUM 70 MG/1
70 TABLET ORAL
Refills: 0 | Status: ACTIVE | COMMUNITY

## 2025-09-04 RX ORDER — GLUCOSAMINE/MSM/CHONDROIT SULF 500-166.6
10 TABLET ORAL
Refills: 0 | Status: ACTIVE | COMMUNITY

## 2025-09-04 RX ORDER — ALPRAZOLAM 0.25 MG/1
0.25 TABLET ORAL
Refills: 0 | Status: ACTIVE | COMMUNITY

## 2025-09-04 RX ORDER — ACETAMINOPHEN 650 MG/1
TABLET, FILM COATED, EXTENDED RELEASE ORAL
Refills: 0 | Status: ACTIVE | COMMUNITY

## 2025-09-05 ENCOUNTER — TRANSCRIPTION ENCOUNTER (OUTPATIENT)
Age: 80
End: 2025-09-05

## 2025-09-05 ENCOUNTER — NON-APPOINTMENT (OUTPATIENT)
Age: 80
End: 2025-09-05

## 2025-09-19 ENCOUNTER — TRANSCRIPTION ENCOUNTER (OUTPATIENT)
Age: 80
End: 2025-09-19